# Patient Record
Sex: FEMALE | Race: WHITE | HISPANIC OR LATINO | Employment: UNEMPLOYED | URBAN - METROPOLITAN AREA
[De-identification: names, ages, dates, MRNs, and addresses within clinical notes are randomized per-mention and may not be internally consistent; named-entity substitution may affect disease eponyms.]

---

## 2020-06-25 ENCOUNTER — LAB VISIT (OUTPATIENT)
Dept: PRIMARY CARE CLINIC | Facility: OTHER | Age: 40
End: 2020-06-25
Payer: OTHER GOVERNMENT

## 2020-06-25 DIAGNOSIS — Z03.818 ENCOUNTER FOR OBSERVATION FOR SUSPECTED EXPOSURE TO OTHER BIOLOGICAL AGENTS RULED OUT: ICD-10-CM

## 2020-06-25 PROCEDURE — U0003 INFECTIOUS AGENT DETECTION BY NUCLEIC ACID (DNA OR RNA); SEVERE ACUTE RESPIRATORY SYNDROME CORONAVIRUS 2 (SARS-COV-2) (CORONAVIRUS DISEASE [COVID-19]), AMPLIFIED PROBE TECHNIQUE, MAKING USE OF HIGH THROUGHPUT TECHNOLOGIES AS DESCRIBED BY CMS-2020-01-R: HCPCS

## 2020-06-28 LAB — SARS-COV-2 RNA RESP QL NAA+PROBE: NOT DETECTED

## 2020-07-04 ENCOUNTER — HOSPITAL ENCOUNTER (EMERGENCY)
Facility: HOSPITAL | Age: 40
Discharge: HOME | End: 2020-07-04
Attending: EMERGENCY MEDICINE
Payer: OTHER GOVERNMENT

## 2020-07-04 VITALS
HEART RATE: 75 BPM | OXYGEN SATURATION: 100 % | BODY MASS INDEX: 25.6 KG/M2 | HEIGHT: 62 IN | DIASTOLIC BLOOD PRESSURE: 66 MMHG | RESPIRATION RATE: 18 BRPM | SYSTOLIC BLOOD PRESSURE: 122 MMHG | WEIGHT: 139.13 LBS | TEMPERATURE: 98 F

## 2020-07-04 DIAGNOSIS — R10.9 RIGHT FLANK PAIN: Primary | ICD-10-CM

## 2020-07-04 DIAGNOSIS — N20.1 URETEROLITHIASIS: ICD-10-CM

## 2020-07-04 LAB
ALBUMIN SERPL BCP-MCNC: 4.1 G/DL (ref 3.5–5.2)
ALP SERPL-CCNC: 63 U/L (ref 55–135)
ALT SERPL W/O P-5'-P-CCNC: 11 U/L (ref 10–44)
ANION GAP SERPL CALC-SCNC: 11 MMOL/L (ref 8–16)
AST SERPL-CCNC: 14 U/L (ref 10–40)
BACTERIA #/AREA URNS HPF: ABNORMAL /HPF
BASOPHILS # BLD AUTO: 0.04 K/UL (ref 0–0.2)
BASOPHILS NFR BLD: 0.3 % (ref 0–1.9)
BILIRUB SERPL-MCNC: 0.4 MG/DL (ref 0.1–1)
BILIRUB UR QL STRIP: NEGATIVE
BUN SERPL-MCNC: 10 MG/DL (ref 6–20)
CALCIUM SERPL-MCNC: 9 MG/DL (ref 8.7–10.5)
CHLORIDE SERPL-SCNC: 107 MMOL/L (ref 95–110)
CLARITY UR: CLEAR
CO2 SERPL-SCNC: 20 MMOL/L (ref 23–29)
COLOR UR: YELLOW
CREAT SERPL-MCNC: 0.8 MG/DL (ref 0.5–1.4)
DIFFERENTIAL METHOD: ABNORMAL
EOSINOPHIL # BLD AUTO: 0 K/UL (ref 0–0.5)
EOSINOPHIL NFR BLD: 0.3 % (ref 0–8)
ERYTHROCYTE [DISTWIDTH] IN BLOOD BY AUTOMATED COUNT: 12 % (ref 11.5–14.5)
EST. GFR  (AFRICAN AMERICAN): >60 ML/MIN/1.73 M^2
EST. GFR  (NON AFRICAN AMERICAN): >60 ML/MIN/1.73 M^2
GLUCOSE SERPL-MCNC: 134 MG/DL (ref 70–110)
GLUCOSE UR QL STRIP: NEGATIVE
HCT VFR BLD AUTO: 38 % (ref 37–48.5)
HGB BLD-MCNC: 12.9 G/DL (ref 12–16)
HGB UR QL STRIP: ABNORMAL
HYALINE CASTS #/AREA URNS LPF: 0 /LPF
IMM GRANULOCYTES # BLD AUTO: 0.05 K/UL (ref 0–0.04)
IMM GRANULOCYTES NFR BLD AUTO: 0.4 % (ref 0–0.5)
KETONES UR QL STRIP: NEGATIVE
LEUKOCYTE ESTERASE UR QL STRIP: NEGATIVE
LIPASE SERPL-CCNC: 32 U/L (ref 4–60)
LYMPHOCYTES # BLD AUTO: 1.3 K/UL (ref 1–4.8)
LYMPHOCYTES NFR BLD: 10.9 % (ref 18–48)
MCH RBC QN AUTO: 31.5 PG (ref 27–31)
MCHC RBC AUTO-ENTMCNC: 33.9 G/DL (ref 32–36)
MCV RBC AUTO: 93 FL (ref 82–98)
MICROSCOPIC COMMENT: ABNORMAL
MONOCYTES # BLD AUTO: 0.4 K/UL (ref 0.3–1)
MONOCYTES NFR BLD: 3.5 % (ref 4–15)
NEUTROPHILS # BLD AUTO: 9.7 K/UL (ref 1.8–7.7)
NEUTROPHILS NFR BLD: 84.6 % (ref 38–73)
NITRITE UR QL STRIP: NEGATIVE
NRBC BLD-RTO: 0 /100 WBC
PH UR STRIP: 6 [PH] (ref 5–8)
PLATELET # BLD AUTO: 277 K/UL (ref 150–350)
PMV BLD AUTO: 10.4 FL (ref 9.2–12.9)
POTASSIUM SERPL-SCNC: 4 MMOL/L (ref 3.5–5.1)
PROT SERPL-MCNC: 7.7 G/DL (ref 6–8.4)
PROT UR QL STRIP: NEGATIVE
RBC # BLD AUTO: 4.1 M/UL (ref 4–5.4)
RBC #/AREA URNS HPF: 80 /HPF (ref 0–4)
SODIUM SERPL-SCNC: 138 MMOL/L (ref 136–145)
SP GR UR STRIP: >=1.03 (ref 1–1.03)
SQUAMOUS #/AREA URNS HPF: 6 /HPF
URN SPEC COLLECT METH UR: ABNORMAL
UROBILINOGEN UR STRIP-ACNC: NEGATIVE EU/DL
WBC # BLD AUTO: 11.51 K/UL (ref 3.9–12.7)
WBC #/AREA URNS HPF: 3 /HPF (ref 0–5)
WBC CLUMPS URNS QL MICRO: ABNORMAL
YEAST URNS QL MICRO: ABNORMAL

## 2020-07-04 PROCEDURE — 80053 COMPREHEN METABOLIC PANEL: CPT

## 2020-07-04 PROCEDURE — 83690 ASSAY OF LIPASE: CPT

## 2020-07-04 PROCEDURE — 25000003 PHARM REV CODE 250: Performed by: EMERGENCY MEDICINE

## 2020-07-04 PROCEDURE — 85025 COMPLETE CBC W/AUTO DIFF WBC: CPT

## 2020-07-04 PROCEDURE — 96374 THER/PROPH/DIAG INJ IV PUSH: CPT

## 2020-07-04 PROCEDURE — 63600175 PHARM REV CODE 636 W HCPCS: Performed by: EMERGENCY MEDICINE

## 2020-07-04 PROCEDURE — 96361 HYDRATE IV INFUSION ADD-ON: CPT

## 2020-07-04 PROCEDURE — 81000 URINALYSIS NONAUTO W/SCOPE: CPT

## 2020-07-04 PROCEDURE — 96375 TX/PRO/DX INJ NEW DRUG ADDON: CPT

## 2020-07-04 PROCEDURE — 99285 EMERGENCY DEPT VISIT HI MDM: CPT | Mod: 25

## 2020-07-04 RX ORDER — KETOROLAC TROMETHAMINE 10 MG/1
10 TABLET, FILM COATED ORAL EVERY 6 HOURS PRN
Qty: 12 TABLET | Refills: 0 | Status: SHIPPED | OUTPATIENT
Start: 2020-07-04

## 2020-07-04 RX ORDER — MORPHINE SULFATE 4 MG/ML
4 INJECTION, SOLUTION INTRAMUSCULAR; INTRAVENOUS
Status: COMPLETED | OUTPATIENT
Start: 2020-07-04 | End: 2020-07-04

## 2020-07-04 RX ORDER — ONDANSETRON 2 MG/ML
4 INJECTION INTRAMUSCULAR; INTRAVENOUS
Status: COMPLETED | OUTPATIENT
Start: 2020-07-04 | End: 2020-07-04

## 2020-07-04 RX ORDER — KETOROLAC TROMETHAMINE 30 MG/ML
15 INJECTION, SOLUTION INTRAMUSCULAR; INTRAVENOUS
Status: COMPLETED | OUTPATIENT
Start: 2020-07-04 | End: 2020-07-04

## 2020-07-04 RX ORDER — TAMSULOSIN HYDROCHLORIDE 0.4 MG/1
0.4 CAPSULE ORAL DAILY
Qty: 7 CAPSULE | Refills: 0 | Status: SHIPPED | OUTPATIENT
Start: 2020-07-04 | End: 2020-07-11

## 2020-07-04 RX ORDER — HYDROCODONE BITARTRATE AND ACETAMINOPHEN 5; 325 MG/1; MG/1
1 TABLET ORAL EVERY 6 HOURS PRN
Qty: 8 TABLET | Refills: 0 | Status: SHIPPED | OUTPATIENT
Start: 2020-07-04

## 2020-07-04 RX ADMIN — MORPHINE SULFATE 4 MG: 4 INJECTION INTRAVENOUS at 07:07

## 2020-07-04 RX ADMIN — SODIUM CHLORIDE 1000 ML: 0.9 INJECTION, SOLUTION INTRAVENOUS at 09:07

## 2020-07-04 RX ADMIN — KETOROLAC TROMETHAMINE 15 MG: 30 INJECTION, SOLUTION INTRAMUSCULAR at 08:07

## 2020-07-04 RX ADMIN — ONDANSETRON 4 MG: 2 INJECTION INTRAMUSCULAR; INTRAVENOUS at 07:07

## 2020-07-04 NOTE — ED PROVIDER NOTES
Encounter Date: 7/4/2020    SCRIBE #1 NOTE: I, Michelle Ahumada, am scribing for, and in the presence of,  Dr. Kong. I have scribed the entire note.       History     Chief Complaint   Patient presents with    Abdominal Pain     Pt. c/o pain to the right flank area that radiates to the abdomen that began around 3am. Pt. took 1 gram of Tylenol at 3 am. Reports having dysuria and a history of kidney stones.    Flank Pain     Time seen by provider: 6:35 AM    This is a 40 y.o. female with a history of HELLP Syndrome and kidney stones who presents to the ED with complaint of right flank and RUQ abdominal pain that started at 3 am this morning. Patient reports the pain is worse with palpation. She reports to have associated dysuria, frequency, non bloody diarrhea, nausea, and three episodes of non bilious, non bloody vomiting. Patient denies fever, chills, shortness of breath, chest pain, hematuria, or any other complaints at this time. She says she had similar pain when she was diagnosed with gastritis and HELLP syndrome.    The history is provided by the patient.     Review of patient's allergies indicates:  No Known Allergies  No past medical history on file.  No past surgical history on file.  No family history on file.  Social History     Tobacco Use    Smoking status: Not on file   Substance Use Topics    Alcohol use: Not on file    Drug use: Not on file     Review of Systems   Constitutional: Negative for fever.   HENT: Negative for sore throat.    Respiratory: Negative for shortness of breath.    Cardiovascular: Negative for chest pain.   Gastrointestinal: Positive for abdominal pain, diarrhea, nausea and vomiting.   Genitourinary: Positive for dysuria, flank pain and frequency.   Musculoskeletal: Negative for back pain.   Skin: Negative for rash.   Neurological: Negative for weakness.   Hematological: Does not bruise/bleed easily.   All other systems reviewed and are negative.      Physical Exam      Initial Vitals [07/04/20 0613]   BP Pulse Resp Temp SpO2   129/65 74 16 98.2 °F (36.8 °C) 100 %      MAP       --         Physical Exam    Nursing note and vitals reviewed.  Constitutional: She appears well-developed and well-nourished. No distress.   HENT:   Head: Normocephalic and atraumatic.   Mouth/Throat: Oropharynx is clear and moist.   Eyes: Conjunctivae and EOM are normal. Pupils are equal, round, and reactive to light.   Neck: Normal range of motion. Neck supple. No stridor present. No tracheal deviation present.   Cardiovascular: Normal rate, regular rhythm and normal heart sounds.   No murmur heard.  Pulmonary/Chest: Breath sounds normal. No respiratory distress.   Abdominal: Soft. Bowel sounds are normal. There is abdominal tenderness. There is CVA tenderness. There is no rebound and no guarding.   RUQ abdominal pain with no rebound or guarding. Right CVA tenderness.   Musculoskeletal: Normal range of motion.   Neurological: She is alert and oriented to person, place, and time. No sensory deficit.   Skin: Skin is warm and dry. Capillary refill takes less than 2 seconds.   Psychiatric: She has a normal mood and affect. Her behavior is normal.         ED Course   Procedures  Labs Reviewed   CBC W/ AUTO DIFFERENTIAL - Abnormal; Notable for the following components:       Result Value    Mean Corpuscular Hemoglobin 31.5 (*)     Gran # (ANC) 9.7 (*)     Immature Grans (Abs) 0.05 (*)     Gran% 84.6 (*)     Lymph% 10.9 (*)     Mono% 3.5 (*)     All other components within normal limits   COMPREHENSIVE METABOLIC PANEL - Abnormal; Notable for the following components:    CO2 20 (*)     Glucose 134 (*)     All other components within normal limits   URINALYSIS, REFLEX TO URINE CULTURE - Abnormal; Notable for the following components:    Specific Gravity, UA >=1.030 (*)     Occult Blood UA 3+ (*)     All other components within normal limits    Narrative:     Specimen Source->Urine   URINALYSIS MICROSCOPIC -  Abnormal; Notable for the following components:    RBC, UA 80 (*)     All other components within normal limits    Narrative:     Specimen Source->Urine   LIPASE          X-Rays:   Independently Interpreted Readings:   Other Readings:  Reviewed by myself, read by radiology.    Imaging Results          US Abdomen Limited (Final result)  Result time 07/04/20 07:57:18    Final result by So Duarte MD (07/04/20 07:57:18)                 Impression:      Incidentally noted mild right hydronephrosis.  Otherwise, no significant sonographic abnormality.      Electronically signed by: So Duarte MD  Date:    07/04/2020  Time:    07:57             Narrative:    EXAMINATION:  US ABDOMEN LIMITED    CLINICAL HISTORY:  Abdominal Pain - Gallbladder;    TECHNIQUE:  Limited ultrasound of the right upper quadrant of the abdomen (including pancreas, liver, gallbladder, common bile duct, and right kidney) was performed.    COMPARISON:  None.    FINDINGS:  Pancreas: Unremarkable in its visualized extent.    IVC: Unremarkable in its visualized extent    Liver: Normal in size, measuring 15.9 cm. Homogeneous echotexture. No focal hepatic lesions.    Gallbladder: No calculi, wall thickening, or pericholecystic fluid.  No sonographic Osorio's sign.    Biliary system: The common duct is not dilated, measuring 2 mm.  No intrahepatic ductal dilatation.    Spleen: Normal in size and echotexture measuring 9.6 x 2.9 cm cm.    Miscellaneous: Incidentally noted mild right hydronephrosis.                               CT Abdomen Pelvis  Without Contrast (Final result)  Result time 07/04/20 07:52:08    Final result by So Duarte MD (07/04/20 07:52:08)                 Impression:      1. Mild right hydroureteronephrosis secondary to a 5 mm calculus in the distal right ureter, near the ureterovesicular junction.  Slight periureteral and perinephric inflammatory change which can be seen in the setting of obstruction and/or infection.   Correlation with urinalysis advised.  2. Nonobstructing left nephrolithiasis.  3. Additional findings as above.      Electronically signed by: So Duarte MD  Date:    07/04/2020  Time:    07:52             Narrative:    EXAMINATION:  CT ABDOMEN PELVIS WITHOUT CONTRAST    CLINICAL HISTORY:  Flank pain, kidney stone suspected;    TECHNIQUE:  Low dose axial images, sagittal and coronal reformations were obtained from the lung bases to the pubic symphysis.  Oral contrast was not administered.    COMPARISON:  None    FINDINGS:  The visualized lung bases are free of pleural fluid or focal consolidation.  The visualized portions of the heart and pericardium are within normal limits.    Please note evaluation of solid organ parenchyma is limited due to lack of IV contrast.  The liver demonstrates no focal abnormality on the basis of this noncontrast exam.  The gallbladder demonstrates no evidence of radiopaque stone.  The spleen, pancreas, and adrenal glands demonstrate an unremarkable noncontrast CT appearance.    There is mild right hydroureteronephrosis secondary to a 5 mm calculus in the distal right ureter, near the ureterovesicular junction.  There is slight right perinephric and periureteral fat stranding which can be seen with obstruction and/or infection.  Correlation with urinalysis advised.  There is a 5 mm calculus in the midpole of the left kidney and punctate calculus in the inferior pole of the left kidney.  No left hydroureteronephrosis.  The urinary bladder is suboptimally distended, limiting evaluation.  The uterus and adnexal regions are within normal limits.  Pelvic phleboliths are present.    The abdominal aorta is nonaneurysmal.  The visualized loops of large and small bowel demonstrate no evidence of obstruction or inflammatory change.  The appendix is unremarkable.  There is no ascites, free intraperitoneal air or portal venous gas.    The visualized osseous structures demonstrate no acute  abnormalities.  Small fat containing umbilical hernia.                              Medical Decision Making:   Clinical Tests:   Lab Tests: Ordered and Reviewed  Radiological Study: Ordered and Reviewed                   ED Course as of Jul 04 0941   Sat Jul 04, 2020   0809 The patient has hematuria and CT is c/w ureterolithiasis and hydronephrosis/hydroureter from a 5 mm stone in the distal ureter. US GB is WNL. Will give IVF.    [ST]   0919 The patient is pain-free at this time.  I will discharge her.    [ST]      ED Course User Index  [ST] Cecile Kong MD                Clinical Impression:     1. Right flank pain    2. Ureterolithiasis            ED Disposition Condition    Discharge Stable        ED Prescriptions     Medication Sig Dispense Start Date End Date Auth. Provider    ketorolac (TORADOL) 10 mg tablet Take 1 tablet (10 mg total) by mouth every 6 (six) hours as needed for Pain. 12 tablet 7/4/2020  Cecile Kong MD    HYDROcodone-acetaminophen (NORCO) 5-325 mg per tablet Take 1 tablet by mouth every 6 (six) hours as needed for Pain. 8 tablet 7/4/2020  Cecile Kong MD    tamsulosin (FLOMAX) 0.4 mg Cap Take 1 capsule (0.4 mg total) by mouth once daily. for 7 days 7 capsule 7/4/2020 7/11/2020 Cecile Kong MD        Follow-up Information     Follow up With Specialties Details Why Contact Info Additional Information    [facility] Schedule an appointment as soon as possible for a visit  [facility] (Tel: [phone]) for a Urology clinic referral [address], [address]  [facility] [address] [phone] At this time [facility] will only use these entries [facility], [facility], and Emergency Department due to COVID-19 precautions.                         I, Cecile Kong, personally performed the services described in this documentation. All medical record entries made by the eileen were at my direction and in my presence.  I  have reviewed the chart and agree that the record reflects my personal performance and is accurate and complete. Cecile Kong M.D. 9:41 AM07/04/2020             Cecile Kong MD  07/04/20 0941

## 2020-07-04 NOTE — ED NOTES
Report received from ZAINA Parekh. Assumed care of pt. Pt resting in stretcher. Reports some relief in pain. Comfort measures initiated, lights dimmed, side rails up x 2, and call light within reach. Will continue to monitor.

## 2020-07-04 NOTE — DISCHARGE INSTRUCTIONS
Take flomax daily until gone  Take toradol every 6 hours as needed for pain.  If you are having severe pain, take norco every 6 hours as needed for pain.

## 2020-07-04 NOTE — ED TRIAGE NOTES
Patient presents to the ED with complaints of severe right right sided flank pain that radiates to right upper abdomen that started around 3 am this morning. Patient states 10 years ago she had calcified kidney stones. Reports some discomfort with urination. Associated symptoms are nausea and vomiting x 3 episodes. Patient moaning on stretcher.     Review of patient's allergies indicates:  No Known Allergies     Patient has verified the spelling of their name and  on armband.   APPEARANCE: Patient is alert, calm, oriented x 4, and appears to be in moderate distress from pain. Patient unable to stay still on stretcher. Moaning in pain.   SKIN: Skin is normal for race, warm, and dry. Normal skin turgor and mucous membranes moist.  CARDIAC: Normal rate and rhythm, no murmur heard. Denies chest pain  RESPIRATORY:Normal rate and effort. Breath sounds clear bilaterally throughout chest. Respirations are equal and unlabored.    GASTRO: Bowel sounds normal, abdomen is soft, no tenderness, and no abdominal distention. +RUQ pain rated 10/10, sharp. Last BM was this morning and was diarrhea, +N/V  MUSCLE: Full ROM. No bony tenderness or soft tissue tenderness. No obvious deformity.  : +slight burning with urination

## 2020-12-21 ENCOUNTER — OFFICE VISIT (OUTPATIENT)
Dept: URGENT CARE | Facility: CLINIC | Age: 40
End: 2020-12-21
Payer: OTHER GOVERNMENT

## 2020-12-21 VITALS
TEMPERATURE: 99 F | DIASTOLIC BLOOD PRESSURE: 68 MMHG | WEIGHT: 145 LBS | SYSTOLIC BLOOD PRESSURE: 105 MMHG | RESPIRATION RATE: 18 BRPM | HEIGHT: 62 IN | OXYGEN SATURATION: 96 % | BODY MASS INDEX: 26.68 KG/M2 | HEART RATE: 74 BPM

## 2020-12-21 DIAGNOSIS — U07.1 COVID-19: Primary | ICD-10-CM

## 2020-12-21 LAB
CTP QC/QA: YES
SARS-COV-2 RDRP RESP QL NAA+PROBE: POSITIVE

## 2020-12-21 PROCEDURE — 99203 PR OFFICE/OUTPT VISIT, NEW, LEVL III, 30-44 MIN: ICD-10-PCS | Mod: S$GLB,,, | Performed by: NURSE PRACTITIONER

## 2020-12-21 PROCEDURE — 99203 OFFICE O/P NEW LOW 30 MIN: CPT | Mod: S$GLB,,, | Performed by: NURSE PRACTITIONER

## 2020-12-21 PROCEDURE — U0002 COVID-19 LAB TEST NON-CDC: HCPCS | Mod: QW,S$GLB,, | Performed by: NURSE PRACTITIONER

## 2020-12-21 PROCEDURE — U0002: ICD-10-PCS | Mod: QW,S$GLB,, | Performed by: NURSE PRACTITIONER

## 2020-12-21 RX ORDER — PROMETHAZINE HYDROCHLORIDE AND DEXTROMETHORPHAN HYDROBROMIDE 6.25; 15 MG/5ML; MG/5ML
5 SYRUP ORAL NIGHTLY
Qty: 180 ML | Refills: 0 | Status: SHIPPED | OUTPATIENT
Start: 2020-12-21

## 2020-12-21 NOTE — PATIENT INSTRUCTIONS
Delsym over the counter for cough.       Please isolate yourself at home.  You may leave home and/or return to work once the following conditions are met:    If you were not hospitalized and are not severely immunocompromised*:   More than 10 days since symptoms first appeared AND   More than 24 hours fever free without medications AND   Symptoms have improved     If you were hospitalized OR are severely immunocompromised*:   More than 20 days since symptoms first appeared   More than 24 hours fever free without medications   Symptoms have improved    If you had no symptoms but tested positive:   More than 10 days since the date of the first positive test (20 days if severely immunocompromised).   If you develop symptoms, then use the guidelines above.     *Definition of severely immunocompromised:  - Current chemotherapy for cancer  - Untreated HIV with CD4 count less than 200  - Combined primary immunodeficiency disorder  - Prednisone more than 20 mg per day for more than 14 days  - Post-transplant patients    Additional instructions:   Separate yourself from other people and animals in your home.   Call ahead before visiting your doctor.   Wear a facemask when around others.   Cover your coughs and sneezes.   Wash your hands often with soap and water; hand  can be used, too.   Avoid sharing personal household items.   Wipe down surfaces used daily.   Monitor your symptoms. Seek prompt medical attention if your illness is worsening (e.g., difficulty breathing).    Before seeking care, call your healthcare provider.   If you have a medical emergency and need to call 911, notify the dispatch personnel that you have, or are being evaluated for COVID-19. If possible, put on a facemask before emergency medical services arrive.        Contact Tracing    As one of the next steps, you will receive a call or text from the [place] Department of Health ([facility]) COVID-19 Tracing Team. See the contact  information below so you know not to ignore the health departments call. It is important that you contact them back immediately so they can help.      Contact Tracer Number:  [phone]  Caller ID for most carriers: [place] [facility]     What is contact tracing?  · Contact tracing is a process that helps identify everyone who has been in close contact with an infected person. Contact tracers let those people know they may have been exposed and guide them on next steps. Confidentiality is important for everyone; no one will be told who may have exposed them to the virus.  · Your involvement is important. The more we know about where and how this virus is spreading, the better chance we have at stopping it from spreading further.  What does exposure mean?  · Exposure means you have been within 6 feet for more than 15 minutes with a person who has or had COVID-19.  What kind of questions do the contact tracers ask?  · A contact tracer will confirm your basic contact information including name, address, phone number, and next of kin, as well as asking about any symptoms you may have had. Theyll also ask you how you think you may have gotten sick, such as places where you may have been exposed to the virus, and people you were with. Those names will never be shared with anyone outside of that call, and will only be used to help trace and stop the spread of the virus.   I have privacy concerns. How will the state use my information?  · Your privacy about your health is important. All calls are completed using call centers that use the appropriate health privacy protection measures (HIPAA compliance), meaning that your patient information is safe. No one will ever ask you any questions related to immigration status. Your health comes first.   Do I have to participate?  · You do not have to participate, but we strongly encourage you to. Contact tracing can help us catch and control new outbreaks as theyre developing to  keep your friends and family safe.   What if I dont hear from anyone?  · If you dont receive a call within 24 hours, you can call the number above right away to inquire about your status. That line is open from 8:00 am - 8:00 p.m., 7 days a week.  Contact tracing saves lives! Together, we have the power to beat this virus and keep our loved ones and neighbors safe.    For more information see CDC link below.      https://www.cdc.gov/coronavirus/2019-ncov/hcp/guidance-prevent-spread.html#precautions        Sources:  CDC, [place] [facility]

## 2020-12-21 NOTE — PROGRESS NOTES
"Subjective:       Patient ID: Marilee Durant is a 40 y.o. female.    Vitals:  height is 5' 2" (1.575 m) and weight is 65.8 kg (145 lb). Her temperature is 98.5 °F (36.9 °C). Her blood pressure is 105/68 and her pulse is 74. Her respiration is 18 and oxygen saturation is 96%.     Chief Complaint: Generalized Body Aches (COVID)    Cough  This is a new problem. The current episode started in the past 7 days (4 DAYS AGO). The problem has been gradually improving. The cough is non-productive. Associated symptoms include chills and a sore throat. Pertinent negatives include no chest pain, fever, headaches, myalgias, rash or shortness of breath. Nothing aggravates the symptoms. Treatments tried: COLD MEDICINE. The treatment provided mild relief.       Constitution: Positive for chills. Negative for fatigue and fever.   HENT: Positive for congestion and sore throat.    Neck: Negative for painful lymph nodes.   Cardiovascular: Negative for chest pain and leg swelling.   Eyes: Negative for double vision and blurred vision.   Respiratory: Positive for cough. Negative for shortness of breath.    Gastrointestinal: Negative for nausea, vomiting and diarrhea.   Genitourinary: Negative for dysuria, frequency, urgency and history of kidney stones.   Musculoskeletal: Negative for joint pain, joint swelling, muscle cramps and muscle ache.   Skin: Negative for color change, pale, rash and bruising.   Allergic/Immunologic: Negative for seasonal allergies.   Neurological: Negative for dizziness, history of vertigo, light-headedness, passing out and headaches.   Hematologic/Lymphatic: Negative for swollen lymph nodes.   Psychiatric/Behavioral: Negative for nervous/anxious, sleep disturbance and depression. The patient is not nervous/anxious.        Objective:      Physical Exam   Constitutional: She is oriented to person, place, and time. She appears well-developed. She is cooperative.  Non-toxic appearance. She does not appear ill. No " distress.   HENT:   Head: Normocephalic and atraumatic.   Ears:   Right Ear: Hearing, tympanic membrane, external ear and ear canal normal.   Left Ear: Hearing, tympanic membrane, external ear and ear canal normal.   Nose: Nose normal. No mucosal edema, rhinorrhea or nasal deformity. No epistaxis. Right sinus exhibits no maxillary sinus tenderness and no frontal sinus tenderness. Left sinus exhibits no maxillary sinus tenderness and no frontal sinus tenderness.   Mouth/Throat: Uvula is midline, oropharynx is clear and moist and mucous membranes are normal. No trismus in the jaw. Normal dentition. No uvula swelling. No oropharyngeal exudate, posterior oropharyngeal edema or posterior oropharyngeal erythema.   Eyes: Conjunctivae and lids are normal. No scleral icterus.   Neck: Trachea normal, full passive range of motion without pain and phonation normal. Neck supple. No neck rigidity. No edema and no erythema present.   Cardiovascular: Normal rate, regular rhythm, normal heart sounds and normal pulses.   Pulmonary/Chest: Effort normal and breath sounds normal. No respiratory distress. She has no decreased breath sounds. She has no rhonchi.   Abdominal: Normal appearance.   Musculoskeletal: Normal range of motion.         General: No deformity.   Neurological: She is alert and oriented to person, place, and time. She exhibits normal muscle tone. Coordination normal.   Skin: Skin is warm, dry, intact, not diaphoretic and not pale. Psychiatric: Her speech is normal and behavior is normal. Judgment and thought content normal.   Nursing note and vitals reviewed.        Assessment:       1. COVID-19        Plan:         COVID-19  -     POCT COVID-19 Rapid Screening  -     COVID-19 Home Symptom Monitoring  - Duration (days): 14  -     promethazine-dextromethorphan (PROMETHAZINE-DM) 6.25-15 mg/5 mL Syrp; Take 5 mLs by mouth every evening.  Dispense: 180 mL; Refill: 0  -     albuterol sulfate 90 mcg/actuation aebs; Inhale 1  Inhaler into the lungs every 4 to 6 hours as needed (wheezing).  Dispense: 1 each; Refill: 0      Results for orders placed or performed in visit on 12/21/20   POCT COVID-19 Rapid Screening   Result Value Ref Range    POC Rapid COVID Positive (A) Negative     Acceptable Yes       Patient was discharged home with an instructional sheet which gave not only information regarding the most likely diagnoses but also information regarding when to return to the emergency department for alarming symptoms and when to seek further care.

## 2020-12-22 ENCOUNTER — NURSE TRIAGE (OUTPATIENT)
Dept: ADMINISTRATIVE | Facility: CLINIC | Age: 40
End: 2020-12-22

## 2020-12-22 NOTE — TELEPHONE ENCOUNTER
Patient states that she cough with depth breath.  Patient went to  yesterday and had a lung evaluation.   gave her bronchodilator,and other medications.  Teaching done of breathing exercises.    Reason for Disposition   Information only question and nurse able to answer    Additional Information   Negative: Nursing judgment   Negative: Nursing judgment   Negative: Nursing judgment   Negative: Nursing judgment    Protocols used: INFORMATION ONLY CALL - NO TRIAGE-A-[place]

## 2020-12-28 ENCOUNTER — LAB VISIT (OUTPATIENT)
Dept: PRIMARY CARE CLINIC | Facility: OTHER | Age: 40
End: 2020-12-28
Attending: INTERNAL MEDICINE
Payer: OTHER GOVERNMENT

## 2020-12-28 DIAGNOSIS — R43.9 PROBLEMS WITH SMELL AND TASTE: ICD-10-CM

## 2020-12-28 PROCEDURE — U0003 INFECTIOUS AGENT DETECTION BY NUCLEIC ACID (DNA OR RNA); SEVERE ACUTE RESPIRATORY SYNDROME CORONAVIRUS 2 (SARS-COV-2) (CORONAVIRUS DISEASE [COVID-19]), AMPLIFIED PROBE TECHNIQUE, MAKING USE OF HIGH THROUGHPUT TECHNOLOGIES AS DESCRIBED BY CMS-2020-01-R: HCPCS

## 2020-12-29 LAB — SARS-COV-2 RNA RESP QL NAA+PROBE: DETECTED

## 2021-04-16 ENCOUNTER — PATIENT MESSAGE (OUTPATIENT)
Dept: RESEARCH | Facility: HOSPITAL | Age: 41
End: 2021-04-16